# Patient Record
Sex: FEMALE | URBAN - METROPOLITAN AREA
[De-identification: names, ages, dates, MRNs, and addresses within clinical notes are randomized per-mention and may not be internally consistent; named-entity substitution may affect disease eponyms.]

---

## 2019-07-19 ENCOUNTER — APPOINTMENT (RX ONLY)
Dept: URBAN - METROPOLITAN AREA CLINIC 151 | Facility: CLINIC | Age: 39
Setting detail: DERMATOLOGY
End: 2019-07-19

## 2019-07-19 DIAGNOSIS — L83 ACANTHOSIS NIGRICANS: ICD-10-CM | Status: INADEQUATELY CONTROLLED

## 2019-07-19 PROCEDURE — ? PRESCRIPTION

## 2019-07-19 PROCEDURE — 99202 OFFICE O/P NEW SF 15 MIN: CPT

## 2019-07-19 PROCEDURE — ? PRESCRIPTION MEDICATION MANAGEMENT

## 2019-07-19 PROCEDURE — ? DIAGNOSIS COMMENT

## 2019-07-19 RX ORDER — FLUOCINOLONE ACETONIDE, HYDROQUINONE, AND TRETINOIN .1; 40; .5 MG/G; MG/G; MG/G
CREAM TOPICAL QHS
Qty: 1 | Refills: 3 | Status: ERX | COMMUNITY
Start: 2019-07-19

## 2019-07-19 RX ADMIN — FLUOCINOLONE ACETONIDE, HYDROQUINONE, AND TRETINOIN: .1; 40; .5 CREAM TOPICAL at 00:00

## 2019-07-19 NOTE — HPI: DISCOLORATION (HYPERPIGMENTATION)
Is This A New Presentation, Or A Follow-Up?: Discoloration
How Severe Is It?: mild
Additional History: Pt states it has flared up significantly recently.

## 2019-07-19 NOTE — PROCEDURE: PRESCRIPTION MEDICATION MANAGEMENT
Plan: Continue daily sunscreen
Initiate Treatment: Tri-Ofe cream QHS x 3 months, take 3 month break then repeat cycle. Avoid sunlight.
Render In Strict Bullet Format?: No
Samples Given: Eucerin Roughness Relief Lotion and CeraVe SA samples and coupons provided
Detail Level: Zone

## 2019-07-19 NOTE — PROCEDURE: DIAGNOSIS COMMENT
Comment: Mild to moderate. Pt had BW with PCP saying she does not have pre-diabetes but notes family hx of hyperpigmentation.
Detail Level: Zone

## 2019-07-19 NOTE — PROCEDURE: MIPS QUALITY
Quality 110: Preventive Care And Screening: Influenza Immunization: Influenza immunization was not ordered or administered, reason not given
Quality 431: Preventive Care And Screening: Unhealthy Alcohol Use - Screening: Patient screened for unhealthy alcohol use using a single question and scores less than 2 times per year
Quality 226: Preventive Care And Screening: Tobacco Use: Screening And Cessation Intervention: Patient screened for tobacco use, is a smoker AND received Cessation Counseling
Quality 131: Pain Assessment And Follow-Up: Pain assessment using a standardized tool is documented as negative, no follow-up plan required
Detail Level: Detailed